# Patient Record
Sex: FEMALE | Race: WHITE | NOT HISPANIC OR LATINO | URBAN - METROPOLITAN AREA
[De-identification: names, ages, dates, MRNs, and addresses within clinical notes are randomized per-mention and may not be internally consistent; named-entity substitution may affect disease eponyms.]

---

## 2017-07-05 ENCOUNTER — APPOINTMENT (OUTPATIENT)
Dept: URBAN - METROPOLITAN AREA CLINIC 319 | Age: 47
Setting detail: DERMATOLOGY
End: 2017-07-05

## 2017-07-05 ENCOUNTER — RX ONLY (RX ONLY)
Age: 47
End: 2017-07-05

## 2017-07-05 DIAGNOSIS — L71.8 OTHER ROSACEA: ICD-10-CM

## 2017-07-05 PROCEDURE — OTHER TREATMENT REGIMEN: OTHER

## 2017-07-05 PROCEDURE — OTHER PRESCRIPTION: OTHER

## 2017-07-05 PROCEDURE — 99213 OFFICE O/P EST LOW 20 MIN: CPT

## 2017-07-05 PROCEDURE — OTHER COUNSELING: OTHER

## 2017-07-05 RX ORDER — AMOXICILLIN 500 MG/1
CAPSULE ORAL
Qty: 60 | Refills: 12 | Status: ERX

## 2017-07-05 RX ORDER — AZELAIC ACID 0.15 G/G
AEROSOL, FOAM TOPICAL
Qty: 1 | Refills: 0 | Status: ERX | COMMUNITY
Start: 2017-07-05

## 2017-07-05 ASSESSMENT — LOCATION ZONE DERM: LOCATION ZONE: FACE

## 2017-07-05 ASSESSMENT — LOCATION SIMPLE DESCRIPTION DERM: LOCATION SIMPLE: RIGHT CHEEK

## 2017-07-05 ASSESSMENT — LOCATION DETAILED DESCRIPTION DERM: LOCATION DETAILED: RIGHT CENTRAL MALAR CHEEK

## 2017-07-05 NOTE — PROCEDURE: TREATMENT REGIMEN
Discontinue Regimen: Soolantra gel apply to face every night
Detail Level: Detailed
Plan: Recommended excel v laser
Continue Regimen: Amoxicillin 500mg take 1 by mouth daily \\nDesonide cream apply twice a day to irritation on face until clear or as needed

## 2017-10-10 ENCOUNTER — RX ONLY (RX ONLY)
Age: 47
End: 2017-10-10

## 2017-10-10 RX ORDER — AMOXICILLIN 500 MG/1
CAPSULE ORAL
Qty: 60 | Refills: 12 | Status: CANCELLED
Stop reason: CLARIF